# Patient Record
Sex: FEMALE | Race: WHITE | Employment: FULL TIME | URBAN - METROPOLITAN AREA
[De-identification: names, ages, dates, MRNs, and addresses within clinical notes are randomized per-mention and may not be internally consistent; named-entity substitution may affect disease eponyms.]

---

## 2019-10-08 PROCEDURE — 88304 TISSUE EXAM BY PATHOLOGIST: CPT | Performed by: SURGERY

## 2020-08-31 ENCOUNTER — OFFICE VISIT (OUTPATIENT)
Dept: OTOLARYNGOLOGY | Facility: CLINIC | Age: 45
End: 2020-08-31
Payer: COMMERCIAL

## 2020-08-31 VITALS
TEMPERATURE: 97 F | WEIGHT: 182 LBS | SYSTOLIC BLOOD PRESSURE: 120 MMHG | HEIGHT: 58.5 IN | BODY MASS INDEX: 37.18 KG/M2 | DIASTOLIC BLOOD PRESSURE: 90 MMHG

## 2020-08-31 DIAGNOSIS — K13.70 ORAL LESION: Primary | ICD-10-CM

## 2020-08-31 PROCEDURE — 3074F SYST BP LT 130 MM HG: CPT | Performed by: OTOLARYNGOLOGY

## 2020-08-31 PROCEDURE — 3080F DIAST BP >= 90 MM HG: CPT | Performed by: OTOLARYNGOLOGY

## 2020-08-31 PROCEDURE — 99203 OFFICE O/P NEW LOW 30 MIN: CPT | Performed by: OTOLARYNGOLOGY

## 2020-08-31 PROCEDURE — 3008F BODY MASS INDEX DOCD: CPT | Performed by: OTOLARYNGOLOGY

## 2020-09-01 NOTE — PROGRESS NOTES
Elizabeth Burch is a 39year old female.  Patient presents with:  Throat Problem: Patient Presents with: Patient referred by Dr. Alyssa Mackey for abnormal tissue in throat     HPI:   She was seen by her dentist recently and noted to have a lesion in the back Nasal Normal External nose - Normal. Nasal septum - Normal, Turbinates - Normal   Neurological Normal Memory - Normal. Cranial nerves - Cranial nerves II through XII grossly intact.    Neck Exam Normal Inspection - Normal. Palpation - Normal. Parotid glan

## 2023-05-15 ENCOUNTER — OFFICE VISIT (OUTPATIENT)
Dept: INTERNAL MEDICINE CLINIC | Facility: CLINIC | Age: 48
End: 2023-05-15

## 2023-05-15 VITALS
OXYGEN SATURATION: 96 % | WEIGHT: 200 LBS | HEIGHT: 58.5 IN | DIASTOLIC BLOOD PRESSURE: 98 MMHG | SYSTOLIC BLOOD PRESSURE: 136 MMHG | BODY MASS INDEX: 40.86 KG/M2 | HEART RATE: 103 BPM

## 2023-05-15 DIAGNOSIS — Z00.00 PHYSICAL EXAM, ANNUAL: Primary | ICD-10-CM

## 2023-05-15 DIAGNOSIS — Z80.0 FH: COLON CANCER IN FIRST DEGREE RELATIVE <60 YEARS OLD: ICD-10-CM

## 2023-05-15 DIAGNOSIS — Z12.11 COLON CANCER SCREENING: ICD-10-CM

## 2023-05-15 DIAGNOSIS — I10 HYPERTENSION, UNSPECIFIED TYPE: ICD-10-CM

## 2023-05-15 PROCEDURE — 3075F SYST BP GE 130 - 139MM HG: CPT | Performed by: INTERNAL MEDICINE

## 2023-05-15 PROCEDURE — 3080F DIAST BP >= 90 MM HG: CPT | Performed by: INTERNAL MEDICINE

## 2023-05-15 PROCEDURE — 3008F BODY MASS INDEX DOCD: CPT | Performed by: INTERNAL MEDICINE

## 2023-05-15 PROCEDURE — 99386 PREV VISIT NEW AGE 40-64: CPT | Performed by: INTERNAL MEDICINE

## 2023-06-05 ENCOUNTER — OFFICE VISIT (OUTPATIENT)
Dept: OBGYN CLINIC | Facility: CLINIC | Age: 48
End: 2023-06-05

## 2023-06-05 VITALS
BODY MASS INDEX: 41 KG/M2 | WEIGHT: 198.38 LBS | HEART RATE: 98 BPM | DIASTOLIC BLOOD PRESSURE: 87 MMHG | SYSTOLIC BLOOD PRESSURE: 132 MMHG

## 2023-06-05 DIAGNOSIS — Z01.419 ENCOUNTER FOR GYNECOLOGICAL EXAMINATION WITHOUT ABNORMAL FINDING: Primary | ICD-10-CM

## 2023-06-05 PROCEDURE — 3079F DIAST BP 80-89 MM HG: CPT | Performed by: OBSTETRICS & GYNECOLOGY

## 2023-06-05 PROCEDURE — 3075F SYST BP GE 130 - 139MM HG: CPT | Performed by: OBSTETRICS & GYNECOLOGY

## 2023-06-05 PROCEDURE — 99386 PREV VISIT NEW AGE 40-64: CPT | Performed by: OBSTETRICS & GYNECOLOGY

## 2023-06-05 NOTE — PROGRESS NOTES
Yamila Avila is a 50year old female [place] Patient's last menstrual period was 2023. Patient presents with:  Gyn Exam: ANNUAL EXAM -- new pt. Was at [facility] prior. Strong FHx breast cancer & colon cancer. Neg genetic testing for herself. OBSTETRICS HISTORY:     OB History    Para Term  AB Living   1       1     SAB IAB Ectopic Multiple Live Births   1              # Outcome Date GA Lbr Luis/2nd Weight Sex Delivery Anes PTL Lv   1 SAB                GYNE HISTORY:     Periods  irregular but normal       Sexual activity:   Not on file        Pap Date: 21  Pap Result Notes: PAP/HPV NEG  Follow Up Recommendation: MAMMO 3-30-23 HARPER BENIGN (PT STATES SHE DOES EVERY 6 MONTHS MAMMO)           View : No data to display.                   MEDICAL HISTORY:     Past Medical History:   Diagnosis Date    Allergic rhinitis     Dermatographia     Obesity      Past Surgical History:   Procedure Laterality Date    CHOLECYSTECTOMY  10/08/2019    lap radha/ASC-Dr Chacon    D & C      for SAB     OB History     T0    L0    SAB1  IAB0  Ectopic0  Multiple0  Live Births0      SOCIAL HISTORY:     Tobacco Use: Low Risk  (2023)      Patient History          Smoking Tobacco Use: Never          Smokeless Tobacco Use: Never          Passive Exposure: Never    FAMILY HISTORY:     Family History   Problem Relation Age of Onset    Heart Disease Father     Stroke Father     Uterine Cancer Mother     Colon Cancer Mother     Diabetes Mother     Hypertension Mother     Colon Cancer Maternal Grandmother     Breast Cancer Sister 48         MEDICATIONS:       Current Outpatient Medications:     Cetirizine HCl (ZYRTEC ALLERGY OR), Take by mouth., Disp: , Rfl:     ALLERGIES:     No Known Allergies      REVIEW OF SYSTEMS:     Constitutional:    denies fever / chills  Eyes:     denies blurred or double vision  Cardiovascular:  denies chest pain or palpitations  Respiratory:    denies shortness of breath  Gastrointestinal:  denies severe abdominal pain, frequent diarrhea or constipation, nausea / vomiting  Genitourinary:    denies dysuria, bothersome incontinence  Skin/Breast:   denies any breast pain, lumps, or discharge  Neurological:    denies frequent severe headaches  Psychiatric:   denies depression or anxiety, thoughts of harming self or others  Heme/Lymph:    denies easy bruising or bleeding      PHYSICAL EXAM:   Blood pressure 132/87, pulse 98, weight 198 lb 6.4 oz (90 kg), last menstrual period 05/12/2023. Constitutional:  well developed, well nourished  Head/Face:  normocephalic  Neck/Thyroid: thyroid symmetric, no thyromegaly, no nodules, no adenopathy  Lymphatic: no abnormal supraclavicular or axillary adenopathy is noted  Breast:   normal without palpable masses, tenderness, asymmetry, nipple discharge, nipple retraction or skin changes  Abdomen:   soft, nontender, nondistended, no masses  Skin/Hair:  no unusual rashes or bruises  Extremities:  no edema, no cyanosis  Psychiatric:   oriented to time, place, person and situation. Appropriate mood and affect    Pelvic Exam:  External Genitalia:  normal appearance, hair distribution, and no lesions  Urethral Meatus:   normal in size, location, without lesions and prolapse  Bladder:    no fullness, masses or tenderness  Vagina:    normal appearance without lesions, no abnormal discharge  Cervix:    normal without tenderness on motion  Uterus:    normal in size, contour, position, mobility, without tenderness  Adnexa:   normal without masses or tenderness  Perineum:   normal  Anus: no hemorroids       ASSESSMENT & PLAN:     Lorena Hoff was seen today for gyn exam.    Diagnoses and all orders for this visit:    Encounter for gynecological examination without abnormal finding    Encouraged SBE. SUMMARY:  Pap: Next cotest 6/24-26 per ASCCP guidelines.   BCM:  Withdrawal  STD screening: declines  Mammogram: done recently  HM updated  Depression screen: Depression Screening (PHQ-2/PHQ-9): Over the LAST 2 WEEKS   Little interest or pleasure in doing things (over the last two weeks)?: Not at all    Feeling down, depressed, or hopeless (over the last two weeks)?: Not at all    PHQ-2 SCORE: 0          FOLLOW-UP     Return in about 1 year (around 6/5/2024) for annual gyne exam, cotest.    Note to patient and family:  The [facility] makes medical notes available to patients in the interest of transparency. However, please be advised that this is a medical document. It is intended as a peer to peer communication. It is written in medical language and may contain abbreviations or verbiage that are technical and unfamiliar. It may appear blunt or direct. Medical documents are intended to carry relevant information, facts as evident, and the clinical opinion of the practitioner.

## 2023-08-22 ENCOUNTER — LAB ENCOUNTER (OUTPATIENT)
Dept: LAB | Age: 48
End: 2023-08-22
Attending: NURSE PRACTITIONER
Payer: COMMERCIAL

## 2023-08-22 ENCOUNTER — LAB ENCOUNTER (OUTPATIENT)
Dept: LAB | Facility: REFERENCE LAB | Age: 48
End: 2023-08-22
Attending: NURSE PRACTITIONER
Payer: COMMERCIAL

## 2023-08-22 ENCOUNTER — E-VISIT (OUTPATIENT)
Dept: TELEHEALTH | Age: 48
End: 2023-08-22
Payer: COMMERCIAL

## 2023-08-22 DIAGNOSIS — J02.9 SORE THROAT: Primary | ICD-10-CM

## 2023-08-22 DIAGNOSIS — Z11.52 ENCOUNTER FOR SCREENING FOR COVID-19: ICD-10-CM

## 2023-08-22 DIAGNOSIS — J02.9 SORE THROAT: ICD-10-CM

## 2023-08-22 LAB
BETA STREP GRP A SCREEN: NEGATIVE
SARS-COV-2 RNA RESP QL NAA+PROBE: NOT DETECTED

## 2023-08-22 PROCEDURE — 99422 OL DIG E/M SVC 11-20 MIN: CPT | Performed by: NURSE PRACTITIONER

## 2023-08-22 PROCEDURE — 87430 STREP A AG IA: CPT | Performed by: NURSE PRACTITIONER

## 2023-08-22 NOTE — PROGRESS NOTES
Karuna Carson is a 50year old female. HPI:   See answers to questions above. Current Outpatient Medications   Medication Sig Dispense Refill    Cetirizine HCl (ZYRTEC ALLERGY OR) Take by mouth. Past Medical History:   Diagnosis Date    Allergic rhinitis     Dermatographia     Obesity       Past Surgical History:   Procedure Laterality Date    CHOLECYSTECTOMY  10/08/2019    adrienne garcia/AURELIA-Dr Chacon    D & C  2007    for SAB      Family History   Problem Relation Age of Onset    Heart Disease Father     Stroke Father     Uterine Cancer Mother     Colon Cancer Mother     Diabetes Mother     Hypertension Mother     Colon Cancer Maternal Grandmother     Breast Cancer Sister 48      Social History:  Social History     Socioeconomic History    Marital status:    Tobacco Use    Smoking status: Never     Passive exposure: Never    Smokeless tobacco: Never   Vaping Use    Vaping Use: Never used   Substance and Sexual Activity    Alcohol use: Yes     Alcohol/week: 2.0 standard drinks of alcohol     Types: 2 Glasses of wine per week     Comment: Occasionally    Drug use: Never         ASSESSMENT AND PLAN:     Sore throat  (primary encounter diagnosis)  Encounter for screening for covid-19    Orders placed for rapid strep and rapid COVID through lab.   Provided with comfort measures  Advised to follow up in person for any new or worsening symptoms      Meds & Refills for this Visit:  Requested Prescriptions      No prescriptions requested or ordered in this encounter       Duration of  the service:  15 minutes    Patient advised to follow up with PCP if no improvement or worsening of symptoms  Refer to [employer] message for specific patient instructions

## 2024-04-11 ENCOUNTER — PATIENT MESSAGE (OUTPATIENT)
Dept: INTERNAL MEDICINE CLINIC | Facility: CLINIC | Age: 49
End: 2024-04-11

## 2024-04-11 DIAGNOSIS — Z12.31 BREAST CANCER SCREENING BY MAMMOGRAM: Primary | ICD-10-CM

## 2024-04-11 NOTE — TELEPHONE ENCOUNTER
Dr Cordero, please advise    Patient requests mammogram order.  No prior mammogram on file.  Patient was instructed to schedule an annual physical with you.  Order pended for approval/review.       Last OV 05/13/23    Future Appointments   Date Time Provider Department Center   5/21/2024 11:30 AM Maureen Montano MD [facility]         From: Karley Zuniga  To: Cara Cordero  Sent: 4/11/2024 10:16 AM CDT  Subject: Annual mammogram     I’d like to schedule the annual mammogram, but can’t find an order or the way to make that appointment on this chloe?  I received an email from you reminding me to schedule it. Thanks

## 2024-04-12 NOTE — TELEPHONE ENCOUNTER
DR Cordero =see below.     See [facility] , mammogram was done on 3/30/23 from [facility] .   LAST OFFICE VISIT 5/15/23.     Future Appointments   Date Time Provider Department Center   5/21/2024 11:30 AM Maureen Montano MD [facility]

## 2024-05-14 ENCOUNTER — HOSPITAL ENCOUNTER (OUTPATIENT)
Dept: MAMMOGRAPHY | Facility: HOSPITAL | Age: 49
Discharge: HOME | End: 2024-05-14
Attending: INTERNAL MEDICINE

## 2024-05-14 DIAGNOSIS — Z12.31 BREAST CANCER SCREENING BY MAMMOGRAM: ICD-10-CM

## 2024-05-14 PROCEDURE — 77063 BREAST TOMOSYNTHESIS BI: CPT | Performed by: INTERNAL MEDICINE

## 2024-05-14 PROCEDURE — 77067 SCR MAMMO BI INCL CAD: CPT | Performed by: INTERNAL MEDICINE

## 2024-05-21 ENCOUNTER — OFFICE VISIT (OUTPATIENT)
Dept: OBGYN CLINIC | Facility: CLINIC | Age: 49
End: 2024-05-21

## 2024-05-21 VITALS
BODY MASS INDEX: 40 KG/M2 | DIASTOLIC BLOOD PRESSURE: 83 MMHG | SYSTOLIC BLOOD PRESSURE: 137 MMHG | HEART RATE: 96 BPM | WEIGHT: 193.63 LBS

## 2024-05-21 DIAGNOSIS — N95.1 PERIMENOPAUSE: ICD-10-CM

## 2024-05-21 DIAGNOSIS — Z12.4 SCREENING FOR CERVICAL CANCER: ICD-10-CM

## 2024-05-21 DIAGNOSIS — Z01.419 ENCOUNTER FOR GYNECOLOGICAL EXAMINATION WITHOUT ABNORMAL FINDING: Primary | ICD-10-CM

## 2024-05-21 PROCEDURE — 99396 PREV VISIT EST AGE 40-64: CPT | Performed by: OBSTETRICS & GYNECOLOGY

## 2024-05-21 PROCEDURE — 3079F DIAST BP 80-89 MM HG: CPT | Performed by: OBSTETRICS & GYNECOLOGY

## 2024-05-21 PROCEDURE — 3075F SYST BP GE 130 - 139MM HG: CPT | Performed by: OBSTETRICS & GYNECOLOGY

## 2024-05-21 NOTE — PROGRESS NOTES
Karley Zuniga is a 49 year old female  Patient's last menstrual period was 01/15/2024 (approximate).   Chief Complaint   Patient presents with    Gyn Exam     Annual -- last period in January.   .    OBSTETRICS HISTORY:     OB History    Para Term  AB Living   1 0     1     SAB IAB Ectopic Multiple Live Births   1              # Outcome Date GA Lbr Luis/2nd Weight Sex Type Anes PTL Lv   1 SAB                GYNE HISTORY:     Periods  irregular       BCM:  Condoms    History   Sexual Activity    Sexual activity: Yes        Pap Date: 21  Pap Result Notes: Neg Pap/HPV // Mammo 23 Incomplete           No data to display                  MEDICAL HISTORY:     Past Medical History:    Allergic rhinitis    Dermatographia    Obesity     Past Surgical History:   Procedure Laterality Date    Cholecystectomy  10/08/2019    lap radha/[facility]-Dr Chacon    D & c      for SAB     OB History    Para Term  AB Living   1 0 0 0 1 0   SAB IAB Ectopic Multiple Live Births   1 0 0 0 0        SOCIAL HISTORY:     Tobacco Use: Low Risk  (2024)    Patient History     Smoking Tobacco Use: Never     Smokeless Tobacco Use: Never     Passive Exposure: Never       FAMILY HISTORY:     Family History   Problem Relation Age of Onset    Heart Disease Father     Stroke Father     Uterine Cancer Mother     Colon Cancer Mother     Diabetes Mother     Hypertension Mother     Colon Cancer Maternal Grandmother     Breast Cancer Sister 50         MEDICATIONS:       Current Outpatient Medications:     Cetirizine HCl (ZYRTEC ALLERGY OR), Take by mouth. (Patient not taking: Reported on 2024), Disp: , Rfl:     ALLERGIES:     No Known Allergies      REVIEW OF SYSTEMS:     Constitutional:    denies fever / chills  Eyes:     denies blurred or double vision  Cardiovascular:  denies chest pain or palpitations  Respiratory:    denies shortness of breath  Gastrointestinal:  denies severe abdominal pain,  frequent diarrhea or constipation, nausea / vomiting  Genitourinary:    denies dysuria, bothersome incontinence  Skin/Breast:   denies any breast pain, lumps, or discharge  Neurological:    denies frequent severe headaches  Psychiatric:   denies depression or anxiety, thoughts of harming self or others  Heme/Lymph:    denies easy bruising or bleeding      PHYSICAL EXAM:   Blood pressure 137/83, pulse 96, weight 193 lb 9.6 oz (87.8 kg), last menstrual period 01/15/2024.  Constitutional:  well developed, well nourished  Head/Face:  normocephalic  Neck/Thyroid: thyroid symmetric, no thyromegaly, no nodules, no adenopathy  Lymphatic: no abnormal supraclavicular or axillary adenopathy is noted  Breast:   normal without palpable masses, tenderness, asymmetry, nipple discharge, nipple retraction or skin changes  Abdomen:   soft, nontender, nondistended, no masses  Skin/Hair:  no unusual rashes or bruises  Extremities:  no edema, no cyanosis  Psychiatric:   oriented to time, place, person and situation. Appropriate mood and affect    Pelvic Exam:  External Genitalia:  normal appearance, hair distribution, and no lesions  Urethral Meatus:   normal in size, location, without lesions and prolapse  Bladder:    no fullness, masses or tenderness  Vagina:    normal appearance without lesions, no abnormal discharge  Cervix:    normal without tenderness on motion  Uterus:    normal in size, contour, position, mobility, without tenderness  Adnexa:   normal without masses or tenderness  Perineum:   normal  Anus: no hemorroids         ASSESSMENT & PLAN:     Karley was seen today for gyn exam.    Diagnoses and all orders for this visit:    Encounter for gynecological examination without abnormal finding    Perimenopause    Screening for cervical cancer  -     ThinPrep PAP Smear; Future  -     Hpv Dna  High Risk , Thin Prep Collect; Future  -     Hpv Dna  High Risk , Thin Prep Collect  -     ThinPrep PAP Smear        SUMMARY:  Pap: Next  cotest today per ASCCP guidelines.  BCM:  Condoms  STD screening: declines  Mammogram: done recently  HM updated  Depression screen:   Depression Screening (PHQ-2/PHQ-9): Over the LAST 2 WEEKS   Little interest or pleasure in doing things: Not at all    Feeling down, depressed, or hopeless: Not at all    PHQ-2 SCORE: 0          FOLLOW-UP     Return in about 1 year (around 5/21/2025) for annual gyne exam.    Note to patient and family:  The 21[employer] Century C[employer] Act makes medical notes available to patients in the interest of transparency.  However, please be advised that this is a medical document.  It is intended as a peer to peer communication.  It is written in medical language and may contain abbreviations or verbiage that are technical and unfamiliar.  It may appear blunt or direct.  Medical documents are intended to carry relevant information, facts as evident, and the clinical opinion of the practitioner.

## 2024-05-22 LAB — HPV I/H RISK 1 DNA SPEC QL NAA+PROBE: NEGATIVE

## 2024-05-24 ENCOUNTER — NURSE ONLY (OUTPATIENT)
Facility: CLINIC | Age: 49
End: 2024-05-24

## 2024-05-24 DIAGNOSIS — Z12.11 COLON CANCER SCREENING: Primary | ICD-10-CM

## 2024-05-24 DIAGNOSIS — Z80.0 FAMILY HISTORY OF COLON CANCER: ICD-10-CM

## 2024-05-24 NOTE — PROGRESS NOTES
Hannah,     Called patient for scheduled telephone colon screening.  Routed due to: BMI >40  Medications, pharmacy, and allergies verified with the patient.   **Please review and advise on colonoscopy and bowel prep orders**.   Thank you!

## 2024-05-24 NOTE — PROGRESS NOTES
Called patient for scheduled telephone colon screening/positive FIT result.   Medications, pharmacy, and allergies verified with the patient.   **Please advise on colonoscopy and bowel prep orders**.     Age 45-66 y/o (Y/N):   66-74 y/o ? Route to GI provider per rotation schedule   › GI MD preference:   › Insurance:  BCBS PPO  › Last PCP Visit: 5/15/2023  IF NO PCP within [facility] ? Not TCS/FIT Eligible   › Last CBC drawn: 9/26/2019  › Date of positive FIT test: N/A  › H/W/BMI: 4'10\"/193 lb/40.3    Special comments/notes:    Telephone colon screening Questionnaires:  Yes No   Are you currently experiencing any new GI symptoms? [] [x]   If yes, symptom details:     Rectal Bleeding with or without bowel movements: [] [x]   Black stool: [] [x]   Dysphagia &Food feeling/getting stuck: [] [x]   Intractable Vomiting: [] [x]   Unexplained weight loss: [] [x]   First colonoscopy? [x] []   Family history of colon cancer?Mother and maternal grandmother [x] []   Any issues with anesthesia?Passed out several hours after a dental procedure. [x] []   If yes, explain:      Personal history of Resp. Issues/Oxygen Use/MARIA LUZ/COPD? [] [x]   If yes, CPAP/BiPAP? [] []   History of devices Pacemaker/Defibrillator/Stents? [] [x]   History of Cardiac/CVA issues/(MI/Stroke):  [] [x]     Medication usage:  Yes  No   Anticoagulants:  Anticoagulant (Except Aspirin) ? Route to RN staff to obtain ordering provider orders [] [x]   Diabetic Meds:   PO DM Meds ? hold day prior and day of procedure  Insulin ? Route to RN clinical staff to obtain provider orders  [] [x]   Weight loss meds (Phentermine/Vyvanse/Saxsenda/etc): [] [x]   Iron/Herbal/Multivitamin Supplement (RX/OTC): [] [x]   Usage of marijuana, CBD &/or vape products: [] [x]           no

## 2024-05-28 RX ORDER — SODIUM, POTASSIUM,MAG SULFATES 17.5-3.13G
SOLUTION, RECONSTITUTED, ORAL ORAL
Qty: 1 EACH | Refills: 0 | Status: SHIPPED | OUTPATIENT
Start: 2024-05-28

## 2024-05-28 NOTE — PROGRESS NOTES
GI Schedulers-  Procedure: colonoscopy  Provider: Dr. Wall, Dr. Mcguire or Dr. Guerra   Dx: high risk CRC screening, family history of colon cancer  Sedation: MAC  Prep: Split dose suprep    Thank you.  APRIL Quick

## 2024-05-30 NOTE — PROGRESS NOTES
Scheduled for:  Colonoscopy [address]25  Provider Name:  Dr. Wall  Date:  Friday, 09/27/2024  Location:  [facility]  Sedation:  Mac  Time:  1;15 pm (pt is aware to arrive at 12;15 pm     Prep: Suprep   Meds/Allergies Reconciled?:  Yes    Diagnosis with codes:   high risk CRC screening Z12.11 family history of colon cancer Z80.0  Was patient informed to call insurance with codes (Y/N):  Yes     Referral sent?:  Referral was sent at the time of electronic surgical scheduling.    EM or [facility] notified?:  I sent an electronic request to Endo Scheduling and received a confirmation today.       Medication Orders:  None  Misc Orders:  None     Further instructions given by staff:  I discussed the prep instructions with the patient which she verbally understood and is aware that I will send the instructions today.via Magton

## 2024-06-19 ENCOUNTER — TELEPHONE (OUTPATIENT)
Dept: INTERNAL MEDICINE CLINIC | Facility: CLINIC | Age: 49
End: 2024-06-19

## 2024-06-19 DIAGNOSIS — Z00.00 PHYSICAL EXAM, ANNUAL: Primary | ICD-10-CM

## 2024-07-09 ENCOUNTER — LAB ENCOUNTER (OUTPATIENT)
Dept: LAB | Age: 49
End: 2024-07-09
Payer: COMMERCIAL

## 2024-07-09 DIAGNOSIS — Z00.00 PHYSICAL EXAM, ANNUAL: ICD-10-CM

## 2024-07-09 LAB
ALBUMIN SERPL-MCNC: 4.6 G/DL (ref 3.2–4.8)
ALBUMIN/GLOB SERPL: 1.8 {RATIO} (ref 1–2)
ALP LIVER SERPL-CCNC: 69 U/L
ALT SERPL-CCNC: 33 U/L
ANION GAP SERPL CALC-SCNC: 7 MMOL/L (ref 0–18)
AST SERPL-CCNC: 24 U/L (ref ?–34)
BASOPHILS # BLD AUTO: 0.03 X10(3) UL (ref 0–0.2)
BASOPHILS NFR BLD AUTO: 0.4 %
BILIRUB SERPL-MCNC: 0.6 MG/DL (ref 0.3–1.2)
BUN BLD-MCNC: 10 MG/DL (ref 9–23)
BUN/CREAT SERPL: 12.2 (ref 10–20)
CALCIUM BLD-MCNC: 10 MG/DL (ref 8.7–10.4)
CHLORIDE SERPL-SCNC: 106 MMOL/L (ref 98–112)
CHOLEST SERPL-MCNC: 213 MG/DL (ref ?–200)
CO2 SERPL-SCNC: 28 MMOL/L (ref 21–32)
CREAT BLD-MCNC: 0.82 MG/DL
DEPRECATED RDW RBC AUTO: 40.4 FL (ref 35.1–46.3)
EGFRCR SERPLBLD CKD-EPI 2021: 88 ML/MIN/1.73M2 (ref 60–?)
EOSINOPHIL # BLD AUTO: 0.19 X10(3) UL (ref 0–0.7)
EOSINOPHIL NFR BLD AUTO: 2.8 %
ERYTHROCYTE [DISTWIDTH] IN BLOOD BY AUTOMATED COUNT: 11.9 % (ref 11–15)
FASTING PATIENT LIPID ANSWER: YES
FASTING STATUS PATIENT QL REPORTED: YES
GLOBULIN PLAS-MCNC: 2.6 G/DL (ref 2–3.5)
GLUCOSE BLD-MCNC: 100 MG/DL (ref 70–99)
HCT VFR BLD AUTO: 44.2 %
HDLC SERPL-MCNC: 67 MG/DL (ref 40–59)
HGB BLD-MCNC: 15 G/DL
IMM GRANULOCYTES # BLD AUTO: 0.02 X10(3) UL (ref 0–1)
IMM GRANULOCYTES NFR BLD: 0.3 %
LDLC SERPL CALC-MCNC: 129 MG/DL (ref ?–100)
LYMPHOCYTES # BLD AUTO: 2.14 X10(3) UL (ref 1–4)
LYMPHOCYTES NFR BLD AUTO: 32 %
MCH RBC QN AUTO: 31.1 PG (ref 26–34)
MCHC RBC AUTO-ENTMCNC: 33.9 G/DL (ref 31–37)
MCV RBC AUTO: 91.5 FL
MONOCYTES # BLD AUTO: 0.47 X10(3) UL (ref 0.1–1)
MONOCYTES NFR BLD AUTO: 7 %
NEUTROPHILS # BLD AUTO: 3.84 X10 (3) UL (ref 1.5–7.7)
NEUTROPHILS # BLD AUTO: 3.84 X10(3) UL (ref 1.5–7.7)
NEUTROPHILS NFR BLD AUTO: 57.5 %
NONHDLC SERPL-MCNC: 146 MG/DL (ref ?–130)
OSMOLALITY SERPL CALC.SUM OF ELEC: 291 MOSM/KG (ref 275–295)
PLATELET # BLD AUTO: 231 10(3)UL (ref 150–450)
POTASSIUM SERPL-SCNC: 4.7 MMOL/L (ref 3.5–5.1)
PROT SERPL-MCNC: 7.2 G/DL (ref 5.7–8.2)
RBC # BLD AUTO: 4.83 X10(6)UL
SODIUM SERPL-SCNC: 141 MMOL/L (ref 136–145)
TRIGL SERPL-MCNC: 96 MG/DL (ref 30–149)
TSI SER-ACNC: 0.99 MIU/ML (ref 0.55–4.78)
VLDLC SERPL CALC-MCNC: 17 MG/DL (ref 0–30)
WBC # BLD AUTO: 6.7 X10(3) UL (ref 4–11)

## 2024-07-09 PROCEDURE — 85025 COMPLETE CBC W/AUTO DIFF WBC: CPT

## 2024-07-09 PROCEDURE — 80053 COMPREHEN METABOLIC PANEL: CPT

## 2024-07-09 PROCEDURE — 80061 LIPID PANEL: CPT

## 2024-07-09 PROCEDURE — 36415 COLL VENOUS BLD VENIPUNCTURE: CPT

## 2024-07-09 PROCEDURE — 84443 ASSAY THYROID STIM HORMONE: CPT

## 2024-07-12 ENCOUNTER — PATIENT MESSAGE (OUTPATIENT)
Dept: INTERNAL MEDICINE CLINIC | Facility: CLINIC | Age: 49
End: 2024-07-12

## 2024-07-12 ENCOUNTER — OFFICE VISIT (OUTPATIENT)
Dept: INTERNAL MEDICINE CLINIC | Facility: CLINIC | Age: 49
End: 2024-07-12
Payer: COMMERCIAL

## 2024-07-12 VITALS
WEIGHT: 194.38 LBS | DIASTOLIC BLOOD PRESSURE: 80 MMHG | OXYGEN SATURATION: 98 % | HEART RATE: 98 BPM | BODY MASS INDEX: 39.71 KG/M2 | HEIGHT: 58.5 IN | SYSTOLIC BLOOD PRESSURE: 108 MMHG

## 2024-07-12 DIAGNOSIS — Z23 NEED FOR VACCINATION: ICD-10-CM

## 2024-07-12 DIAGNOSIS — S46.912A STRAIN OF LEFT SHOULDER, INITIAL ENCOUNTER: ICD-10-CM

## 2024-07-12 DIAGNOSIS — E55.9 VITAMIN D DEFICIENCY: ICD-10-CM

## 2024-07-12 DIAGNOSIS — Z00.00 PHYSICAL EXAM, ANNUAL: Primary | ICD-10-CM

## 2024-07-12 DIAGNOSIS — R73.01 ELEVATED FASTING BLOOD SUGAR: ICD-10-CM

## 2024-07-12 PROCEDURE — 90715 TDAP VACCINE 7 YRS/> IM: CPT | Performed by: INTERNAL MEDICINE

## 2024-07-12 PROCEDURE — 3079F DIAST BP 80-89 MM HG: CPT | Performed by: INTERNAL MEDICINE

## 2024-07-12 PROCEDURE — 90471 IMMUNIZATION ADMIN: CPT | Performed by: INTERNAL MEDICINE

## 2024-07-12 PROCEDURE — 99396 PREV VISIT EST AGE 40-64: CPT | Performed by: INTERNAL MEDICINE

## 2024-07-12 PROCEDURE — 3074F SYST BP LT 130 MM HG: CPT | Performed by: INTERNAL MEDICINE

## 2024-07-12 PROCEDURE — 3008F BODY MASS INDEX DOCD: CPT | Performed by: INTERNAL MEDICINE

## 2024-07-12 NOTE — PROGRESS NOTES
Karley Zuniga is a 49 year old female.  Chief Complaint   Patient presents with    Physical     Would like tdap today        HPI:   PT COMES FOR HER ANNUAL PHYSICAL   C/C ANNUAL physical  C/O overall doing well and planning on retiring end of this yr and has a list of things she will do to keep busy       HISTORY  5/2023   New patient  C/C establish care-used to see Dr. Poole  C/o annual physical    irregular periods , no period x 60 days and then heavy bleeding - 4 days but with  clots            PMH  dermatography -derm      Lives with  and 2 dogs , works a controller /ACCOUNTING           Current Outpatient Medications   Medication Sig Dispense Refill    Na Sulfate-K Sulfate-Mg Sulf (SUPREP BOWEL PREP KIT) 17.5-3.13-1.6 GM/177ML Oral Solution Take as directed by GI clinic. Okay to substitute for generic. 1 each 0    Cetirizine HCl (ZYRTEC ALLERGY OR) Take by mouth. (Patient not taking: Reported on 5/21/2024)        Past Medical History:    Allergic rhinitis    Dermatographia    Obesity      Past Surgical History:   Procedure Laterality Date    Cholecystectomy  10/08/2019    adrienne garcia/AURELIA-Dr Chacon    D & c  2007    for SAB      Social History:  Social History     Socioeconomic History    Marital status:    Tobacco Use    Smoking status: Never     Passive exposure: Never    Smokeless tobacco: Never   Vaping Use    Vaping status: Never Used   Substance and Sexual Activity    Alcohol use: Yes     Alcohol/week: 2.0 standard drinks of alcohol     Types: 2 Glasses of wine per week     Comment: Occasionally    Drug use: Never    Sexual activity: Yes        REVIEW OF SYSTEMS:   GENERAL HEALTH: No fevers, chills, sweats, fatigue  VISION: No recent vision problems, blurry vision or double vision  HEENT: No decreased hearing ear pain nasal congestion or sore throat  SKIN: denies any unusual skin lesions or rashes  RESPIRATORY: denies shortness of breath, cough, wheezing  CARDIOVASCULAR: denies chest pain  on exertion, palpitations, swelling in feet  GI: denies abdominal pain and + heartburn- ,no occasionally may be due to meals nausea or vomiting  : No Pain on urination, change in the color of urine, discharge, urinating frequently  MUS: No back pain, joint pain, muscle pain  NEURO: denies headaches , anxiety, depression    EXAM:   /80   Pulse 111   Ht 4' 10.5\" (1.486 m)   Wt 194 lb 6.4 oz (88.2 kg)   LMP 01/15/2024 (Approximate)   SpO2 98%   BMI 39.94 kg/m²   GENERAL: well developed, well nourished,in no apparent distress  SKIN: no rashes,no suspicious lesions  HEENT: atraumatic, normocephalic,ears and throat are clear, no frontal or maxillary Sinus tenderness, pupils equal reactive to light bilaterally, extraocular muscles intact  NECK: supple,no adenopathy,nontender   LUNGS: clear to auscultation, no wheeze  CARDIO: RRR without murmur  GI: good BS's,no masses or tenderness  EXTREMITIES: no cyanosis, or edema    ASSESSMENT AND PLAN:   Diagnoses and all orders for this visit:    Physical exam, annual  Patient to watch as she is exercise, seatbelt use no texting driving, sunscreen use advised    Need for vaccination  -     TdaP (Boostrix/Adacel) Vaccine (> 7 Y)  Tdap today  Strain of left shoulder, initial encounter    Left muscle strain - Can try topical agent such as [place] Aspercreme IcyHot and then stick a Salonpas on during the day when she is at work, alternate holding heavy bads      Preventative medicine  Mammogram 5/2024  Pap smear 6/2021 and 5/2024 dr choi   Colonoscopy- scheduled   Labs 7/2024     The patient indicates understanding of these issues and agrees to the plan.  No follow-ups on file.

## 2024-07-14 NOTE — TELEPHONE ENCOUNTER
From: Karley Zuniga  To: Cara Cordero  Sent: 7/12/2024 10:49 AM CDT  Subject: Hi again- Just wanted to ask about your view of probiotic and prebiotics? I’ve been reading so much about the connection of our health to our gut and haven’t incorporated those yet. Thanks!    Do you recommend probiotics and/or prebiotics?

## 2024-09-20 ENCOUNTER — PATIENT MESSAGE (OUTPATIENT)
Dept: INTERNAL MEDICINE CLINIC | Facility: CLINIC | Age: 49
End: 2024-09-20

## 2024-09-22 NOTE — TELEPHONE ENCOUNTER
From: Karley Zuniga  To: Cara Cordero  Sent: 9/20/2024 3:41 PM CDT  Subject: Prep for colonoscopy     Hi there-  I’m scheduled for my colonoscopy on Friday the 27th. The split prep dose instructions indicates the prescription should be for “GoLytely, NuLytely, Trilyte or CoLyte”. I don’t see that in the prescriptions available for me. Can someone please check on the right prep prescription? Thanks!

## 2024-09-23 ENCOUNTER — TELEPHONE (OUTPATIENT)
Facility: CLINIC | Age: 49
End: 2024-09-23

## 2024-09-23 RX ORDER — PROGESTERONE 100 MG/1
100 CAPSULE ORAL NIGHTLY
COMMUNITY
Start: 2024-08-28

## 2024-09-23 RX ORDER — ESTRADIOL 0.04 MG/D
1 PATCH, EXTENDED RELEASE TRANSDERMAL
COMMUNITY
Start: 2024-08-28

## 2024-09-23 NOTE — TELEPHONE ENCOUNTER
Spoke to patient    Her instructions had Golytely, Nulytely, Colytely checked but Suprep was ordered. Patient wanted to confirm it is okay to take the suprep instead.    I explained that the suprep was ordered and she can follow the same directions in her mychart.    Patient verbalized understanding and had no further questions

## 2024-09-27 ENCOUNTER — ANESTHESIA (OUTPATIENT)
Dept: ENDOSCOPY | Age: 49
End: 2024-09-27
Payer: COMMERCIAL

## 2024-09-27 ENCOUNTER — HOSPITAL ENCOUNTER (OUTPATIENT)
Age: 49
Setting detail: HOSPITAL OUTPATIENT SURGERY
Discharge: HOME | End: 2024-09-27
Attending: INTERNAL MEDICINE | Admitting: INTERNAL MEDICINE
Payer: COMMERCIAL

## 2024-09-27 ENCOUNTER — ANESTHESIA EVENT (OUTPATIENT)
Dept: ENDOSCOPY | Age: 49
End: 2024-09-27
Payer: COMMERCIAL

## 2024-09-27 VITALS
HEIGHT: 58.5 IN | OXYGEN SATURATION: 99 % | SYSTOLIC BLOOD PRESSURE: 115 MMHG | RESPIRATION RATE: 20 BRPM | BODY MASS INDEX: 38.82 KG/M2 | WEIGHT: 190 LBS | DIASTOLIC BLOOD PRESSURE: 87 MMHG | TEMPERATURE: 98 F | HEART RATE: 64 BPM

## 2024-09-27 DIAGNOSIS — Z12.11 COLON CANCER SCREENING: ICD-10-CM

## 2024-09-27 DIAGNOSIS — Z80.0 FAMILY HISTORY OF COLON CANCER: ICD-10-CM

## 2024-09-27 LAB — B-HCG UR QL: NEGATIVE

## 2024-09-27 PROCEDURE — 81025 URINE PREGNANCY TEST: CPT

## 2024-09-27 PROCEDURE — 88305 TISSUE EXAM BY PATHOLOGIST: CPT | Performed by: INTERNAL MEDICINE

## 2024-09-27 PROCEDURE — 45385 COLONOSCOPY W/LESION REMOVAL: CPT | Performed by: INTERNAL MEDICINE

## 2024-09-27 PROCEDURE — 99070 SPECIAL SUPPLIES PHYS/QHP: CPT | Performed by: INTERNAL MEDICINE

## 2024-09-27 RX ORDER — SODIUM CHLORIDE, SODIUM LACTATE, POTASSIUM CHLORIDE, CALCIUM CHLORIDE 600; 310; 30; 20 MG/100ML; MG/100ML; MG/100ML; MG/100ML
INJECTION, SOLUTION INTRAVENOUS CONTINUOUS
Status: DISCONTINUED | OUTPATIENT
Start: 2024-09-27 | End: 2024-09-27

## 2024-09-27 RX ORDER — LIDOCAINE HYDROCHLORIDE 10 MG/ML
INJECTION, SOLUTION EPIDURAL; INFILTRATION; INTRACAUDAL; PERINEURAL AS NEEDED
Status: DISCONTINUED | OUTPATIENT
Start: 2024-09-27 | End: 2024-09-27 | Stop reason: SURG

## 2024-09-27 RX ORDER — NALOXONE HYDROCHLORIDE 0.4 MG/ML
0.08 INJECTION, SOLUTION INTRAMUSCULAR; INTRAVENOUS; SUBCUTANEOUS ONCE AS NEEDED
Status: DISCONTINUED | OUTPATIENT
Start: 2024-09-27 | End: 2024-09-27

## 2024-09-27 RX ADMIN — LIDOCAINE HYDROCHLORIDE 50 MG: 10 INJECTION, SOLUTION EPIDURAL; INFILTRATION; INTRACAUDAL; PERINEURAL at 13:27:00

## 2024-09-27 RX ADMIN — SODIUM CHLORIDE, SODIUM LACTATE, POTASSIUM CHLORIDE, CALCIUM CHLORIDE: 600; 310; 30; 20 INJECTION, SOLUTION INTRAVENOUS at 13:27:00

## 2024-09-27 NOTE — DISCHARGE INSTRUCTIONS
Home Care Instructions for Colonoscopy with Sedation    Diet:  - Resume your regular diet as tolerated unless otherwise instructed.  - Start with light meals to minimize bloating.  - Do not drink alcohol today.    Medication:  - If you have questions about resuming your normal medications, please contact your Primary Care Physician.    Activities:  - Take it easy today. Do not return to work today.  - Do not drive today.  - Do not operate any machinery today (including kitchen equipment).    Colonoscopy:  - You may notice some rectal \"spotting\" (a little blood on the toilet tissue) for a day or two after the exam. This is normal.  - If you experience any rectal bleeding (not spotting), persistent tenderness or sharp severe abdominal pains, oral temperature over 100 degrees Fahrenheit, light-headedness or dizziness, or any other problems, contact your doctor.      **If unable to reach your doctor, please go to the [facility] Emergency Room**    - Your referring physician will receive a full report of your examination.  - If you do not hear from your doctor's office within two weeks of your biopsy, please call them for your results.    You may be able to see your laboratory results in [employer] between 4 and 7 business days.  In some cases, your physician may not have viewed the results before they are released to [employer].  If you have questions regarding your results contact the physician who ordered the test/exam by phone or via [employer] by choosing \"Ask a Medical Question.\"

## 2024-09-27 NOTE — OPERATIVE REPORT
COLONOSCOPY REPORT    Karley Zuniga     1975 Age 49 year old   PCP Cara Cordero MD Endoscopist Amanda Wall MD       Date of procedure: 24    Procedure: Colonoscopy w/ cold snare polypectomy     Pre-operative diagnosis: CRC screening     Post-operative diagnosis: colon polyps, diverticulosis     Medications: MAC    Withdrawal time: 25 minutes    Procedure:  Informed consent was obtained from the patient after the risks of the procedure were discussed, including but not limited to bleeding, perforation, aspiration, infection, or possibility of a missed lesion. After discussions of the risks/benefits and alternatives to this procedure, as well as the planned sedation, the patient was placed in the left lateral decubitus position and begun on continuous blood pressure pulse oximetry and EKG monitoring and this was maintained throughout the procedure. Once an adequate level of sedation was obtained a digital rectal exam was completed. Then the lubricated tip of the [id number] diagnostic video colonoscope was inserted and advanced without difficulty to the cecum using water immersion and CO2 insufflation technique. The cecum was identified by localizing the trifold, the appendix and the ileocecal valve. Withdrawal was begun with thorough washing and careful examination of the colonic walls and folds. A routine second examination of the cecum/ascending colon was performed. Photodocumentation was obtained. The bowel prep was excellent. Views of the colon were excellent with washing. I then carefully withdrew the instrument from the patient who tolerated the procedure well.     Complications: none.    Findings:   1. Five polyps noted as follows:      A. THREE -- 4-8 mm polyps in the ascending colon; sessile morphology; cold snare polypectomy performed, polyps retrieved.      B. TWO -- 3-5 mm polyps in the transverse colon; sessile morphology; cold snare polypectomy performed, polyps  retrieved.    2. Diverticulosis: few in the sigmoid colon.    3. Ileocecal valve appeared healthy and normal. The examined portion of the terminal ileum appeared normal.     4. The colonic mucosa throughout the colon showed normal vascular pattern, without evidence of angioectasias or inflammation.     5. A retroflexed view of the rectum appeared normal.    6. PER: normal rectal tone, no masses palpated.     Impression:   Five sub-centimeter polyps removed.   Few sigmoid diverticula.   The colon was otherwise normal with glistening mucosa and intact vascular pattern.    Recommend:  Await pathology. The interval for the next colonoscopy will be determined after reviewing pathology. If new signs or symptoms develop, colonoscopy may need to be repeated sooner.   High fiber diet.  Monitor for blood in the stool. If having more than just tinge of blood, call office or go to the ER.  Avoid NSAIDs (motrin, ibuprofen, aleve, advil, naproxen, midol, naprosyn, excedrin) for 14 days.     >>>If tissue was obtained and you have not received your pathology results either by phone or letter within 2 weeks, please call our office at [phone].    Specimens: colon polyps     Blood loss: <1 ml

## 2024-09-27 NOTE — H&P
History & Physical Examination    Patient Name: Karley Zuniga  MRN: [id number]  CSN: [id number]  YOB: 1975    Diagnosis: screening for colon cancer    Medications Prior to Admission   Medication Sig Dispense Refill Last Dose    Estradiol 0.0375 MG/24HR Transdermal Patch Biweekly Place 1 patch onto the skin twice a week.       progesterone 100 MG Oral Cap Take 1 capsule (100 mg total) by mouth nightly. TAKE AT BEDTIME       Na Sulfate-K Sulfate-Mg Sulf (SUPREP BOWEL PREP KIT) 17.5-3.13-1.6 GM/177ML Oral Solution Take as directed by GI clinic. Okay to substitute for generic. 1 each 0     Cetirizine HCl (ZYRTEC ALLERGY OR) Take by mouth. (Patient not taking: Reported on 5/21/2024)        No current facility-administered medications for this encounter.       Allergies:   Allergies   Allergen Reactions    Nickel RASH       Past Medical History:    Allergic rhinitis    Dermatographia    Obesity     Past Surgical History:   Procedure Laterality Date    Cholecystectomy  10/08/2019    adrienne garcia/[facility]-Dr Chacon    D & c  2007    for SAB     Family History   Problem Relation Age of Onset    Heart Disease Father     Stroke Father     Uterine Cancer Mother     Colon Cancer Mother     Diabetes Mother     Hypertension Mother     Colon Cancer Maternal Grandmother     Breast Cancer Sister 50     Social History     Tobacco Use    Smoking status: Never     Passive exposure: Never    Smokeless tobacco: Never   Substance Use Topics    Alcohol use: Yes     Alcohol/week: 2.0 standard drinks of alcohol     Types: 2 Glasses of wine per week     Comment: Occasionally       SYSTEM Check if Review is Normal Check if Physical Exam is Normal If not normal, please explain:   HEENT [X ] [ X]    NECK  [X ] [ X]    HEART [X ] [ X]    LUNGS [X ] [ X]    ABDOMEN [X ] [ X]    EXTREMITIES [X ] [ X]    OTHER        I have discussed the risks and benefits and alternatives of the procedure with the patient/family.  They understand and agree  to proceed with plan of care.   I have reviewed the History and Physical done within the last 30 days.  Any changes noted above.    Amanda Wall MD  [facility]

## 2024-09-27 NOTE — ANESTHESIA PREPROCEDURE EVALUATION
Anesthesia PreOp Note    HPI:     Karley Zuniga is a 49 year old female who presents for preoperative consultation requested by: Amanda Wall MD    Date of Surgery: 9/27/2024    Procedure(s):  COLONOSCOPY  Indication: Colon cancer screening/ Family history of colon cancer    Relevant Problems   No relevant active problems       NPO:  Last Liquid Consumption Date: 09/27/24  Last Liquid Consumption Time: 0930  Last Solid Consumption Date: 09/25/24  Last Solid Consumption Time: 2300  Last Liquid Consumption Date: 09/27/24          History Review:  Patient Active Problem List    Diagnosis Date Noted    FH: colon cancer in first degree relative <60 years old 05/15/2023       Past Medical History:    Allergic rhinitis    Dermatographia    Obesity       Past Surgical History:   Procedure Laterality Date    Cholecystectomy  10/08/2019    adrienne garcia/[facility]-Dr Chacon    D & c  2007    for SAB       Medications Prior to Admission   Medication Sig Dispense Refill Last Dose    Estradiol 0.0375 MG/24HR Transdermal Patch Biweekly Place 1 patch onto the skin twice a week.    at on    progesterone 100 MG Oral Cap Take 1 capsule (100 mg total) by mouth nightly. TAKE AT BEDTIME   9/26/2024    Na Sulfate-K Sulfate-Mg Sulf (SUPREP BOWEL PREP KIT) 17.5-3.13-1.6 GM/177ML Oral Solution Take as directed by GI clinic. Okay to substitute for generic. 1 each 0     Cetirizine HCl (ZYRTEC ALLERGY OR) Take by mouth. (Patient not taking: Reported on 5/21/2024)        Current Facility-Administered Medications Ordered in Epic   Medication Dose Route Frequency Provider Last Rate Last Admin    lactated ringers infusion   Intravenous Continuous Amanda Wall MD         No current [facility]-ordered outpatient medications on file.       Allergies   Allergen Reactions    Nickel RASH       Family History   Problem Relation Age of Onset    Heart Disease Father     Stroke Father     Uterine Cancer Mother     Colon Cancer Mother      Diabetes Mother     Hypertension Mother     Colon Cancer Maternal Grandmother     Breast Cancer Sister 50     Social History     Socioeconomic History    Marital status:    Tobacco Use    Smoking status: Never     Passive exposure: Never    Smokeless tobacco: Never   Vaping Use    Vaping status: Never Used   Substance and Sexual Activity    Alcohol use: Yes     Alcohol/week: 2.0 standard drinks of alcohol     Types: 2 Glasses of wine per week     Comment: Occasionally    Drug use: Never    Sexual activity: Yes       Available pre-op labs reviewed.  Lab Results   Component Value Date    WBC 6.7 07/09/2024    RBC 4.83 07/09/2024    HGB 15.0 07/09/2024    HCT 44.2 07/09/2024    MCV 91.5 07/09/2024    MCH 31.1 07/09/2024    MCHC 33.9 07/09/2024    RDW 11.9 07/09/2024    .0 07/09/2024    URINEPREG Negative 09/27/2024     Lab Results   Component Value Date     07/09/2024    K 4.7 07/09/2024     07/09/2024    CO2 28.0 07/09/2024    BUN 10 07/09/2024    CREATSERUM 0.82 07/09/2024     (H) 07/09/2024    CA 10.0 07/09/2024          Vital Signs:  Body mass index is 39.03 kg/m².   height is 1.486 m (4' 10.5\") and weight is 86.2 kg (190 lb). Her blood pressure is 131/78 and her pulse is 89. Her respiration is 20 and oxygen saturation is 97%.   Vitals:    09/23/24 1353 09/27/24 1219   BP:  131/78   Pulse:  89   Resp:  20   SpO2:  97%   Weight: 86.2 kg (190 lb) 86.2 kg (190 lb)   Height: 1.486 m (4' 10.5\") 1.486 m (4' 10.5\")        Anesthesia Evaluation     Patient summary reviewed and Nursing notes reviewed    Airway   Mallampati: I  TM distance: >3 FB  Neck ROM: full  Dental      Pulmonary - negative ROS and normal exam   Cardiovascular - negative ROS and normal exam    Neuro/Psych - negative ROS     GI/Hepatic/Renal - negative ROS     Endo/Other - negative ROS   Abdominal   (+) obese                 Anesthesia Plan:   ASA:  2  Plan:   MAC  Informed Consent Plan and Risks Discussed With:   Patient  Discussed plan with:  Surgeon      I have informed Karley Zuniga and/or legal guardian or family member of the nature of the anesthetic plan, benefits, risks including possible dental damage if relevant, major complications, and any alternative forms of anesthetic management.   All of the patient's questions were answered to the best of my ability. The patient desires the anesthetic management as planned.  PARIS SHAY MD  9/27/2024 1:19 PM  Present on Admission:  **None**

## 2024-09-27 NOTE — ANESTHESIA POSTPROCEDURE EVALUATION
Patient: Karley Zuniga    Procedure Summary       Date: 09/27/24 Room / Location: [facility] ENDOSCOPY 02 / [facility] ENDO    Anesthesia Start: 1325 Anesthesia Stop:     Procedure: COLONOSCOPY Diagnosis:       Colon cancer screening      Family history of colon cancer      (Polyps, Diverticulosis)    Surgeons: Amanda Wall MD Anesthesiologist: Ernestina Mendoza MD    Anesthesia Type: MAC ASA Status: 2            Anesthesia Type: MAC    Vitals Value Taken Time   /68 09/27/24 1411   Temp 97.5 °F (36.4 °C) 09/27/24 1411   Pulse 78 09/27/24 1411   Resp 16 09/27/24 1411   SpO2 96 % 09/27/24 1411       [facility] AN Post Evaluation:   Patient Evaluated in PACU  Patient Participation: complete - patient participated  Level of Consciousness: awake  Pain Management: adequate  Airway Patency:patent  Dental exam unchanged from preop  Yes    Cardiovascular Status: acceptable  Respiratory Status: acceptable  Postoperative Hydration acceptable      ERNESTINA MENDOZA MD  9/27/2024 2:11 PM

## 2024-09-30 ENCOUNTER — TELEPHONE (OUTPATIENT)
Facility: CLINIC | Age: 49
End: 2024-09-30

## 2024-09-30 NOTE — TELEPHONE ENCOUNTER
Health Maintenance Updated.    3 year colonoscopy recall entered into patient outreach in [facility].  Next colonoscopy will be due 9/27/2027.

## 2024-09-30 NOTE — TELEPHONE ENCOUNTER
----- Message from Amanda Wall sent at 9/30/2024  9:31 AM CDT -----  GI Staff:    Can you please place recall for this patient to have a colonoscopy in 3 years.    Thank you,  Amanda

## 2024-09-30 NOTE — PROGRESS NOTES
GI Staff:    Can you please place recall for this patient to have a colonoscopy in 3 years.    Thank you,  Amanda

## 2025-04-17 ENCOUNTER — PATIENT MESSAGE (OUTPATIENT)
Dept: OBGYN CLINIC | Facility: CLINIC | Age: 50
End: 2025-04-17

## 2025-04-17 DIAGNOSIS — Z12.31 ENCOUNTER FOR SCREENING MAMMOGRAM FOR MALIGNANT NEOPLASM OF BREAST: Primary | ICD-10-CM

## 2025-05-23 ENCOUNTER — OFFICE VISIT (OUTPATIENT)
Dept: OBGYN CLINIC | Facility: CLINIC | Age: 50
End: 2025-05-23
Payer: COMMERCIAL

## 2025-05-23 VITALS
BODY MASS INDEX: 40.29 KG/M2 | WEIGHT: 197.19 LBS | DIASTOLIC BLOOD PRESSURE: 82 MMHG | SYSTOLIC BLOOD PRESSURE: 118 MMHG | HEIGHT: 58.5 IN | HEART RATE: 101 BPM

## 2025-05-23 DIAGNOSIS — Z01.419 ENCOUNTER FOR GYNECOLOGICAL EXAMINATION WITHOUT ABNORMAL FINDING: Primary | ICD-10-CM

## 2025-05-23 DIAGNOSIS — N95.1 PERIMENOPAUSE: ICD-10-CM

## 2025-05-23 PROCEDURE — 99396 PREV VISIT EST AGE 40-64: CPT | Performed by: OBSTETRICS & GYNECOLOGY

## 2025-05-23 PROCEDURE — 3074F SYST BP LT 130 MM HG: CPT | Performed by: OBSTETRICS & GYNECOLOGY

## 2025-05-23 PROCEDURE — 3008F BODY MASS INDEX DOCD: CPT | Performed by: OBSTETRICS & GYNECOLOGY

## 2025-05-23 PROCEDURE — 3079F DIAST BP 80-89 MM HG: CPT | Performed by: OBSTETRICS & GYNECOLOGY

## 2025-05-23 NOTE — PROGRESS NOTES
The following individual(s) verbally consented to be recorded using ambient AI listening technology and understand that they can each withdraw their consent to this listening technology at any point by asking the clinician to turn off or pause the recording:    Patient name: Karley Zuniga   Vascular Attending:  Dr Ngo

## 2025-05-27 NOTE — PROGRESS NOTES
Karley Zuniga is a 50 year old female  Patient's last menstrual period was 2025 (exact date).   Chief Complaint   Patient presents with    Gyn Exam     ANNUAL EXAM   .  History of Present Illness  Karley Zuniga is a 50 year old female who presents for her annual gynecologic exam.     Her last Pap smear in May 2024 was normal with negative HPV, and her last mammogram was also normal. She has an upcoming mammogram scheduled for 2025.    Upset her perimenopausal Sx not taken seriously last visit. [place] she was told to wait until 12 months of no period to be treated. Therefore she called on line [facility] clinic for treatment. She started hormone replacement therapy (HRT) in 2024 through an online service, taking estrogen and progesterone. No exam done. Just did call to nurse. She uses an estrogen patch, changing it every Tuesday and Friday, and takes 100 mg of progesterone at bedtime. She has experienced significant improvement in shoulder pain since starting HRT. However, she continues to experience significant vaginal dryness, weight gain, and occasional brain fog. She also reports a lack of interest in sex, partly due to vaginal dryness and her 's erectile dysfunction issues.    She has experienced eczema, particularly in areas of friction, and sometimes on her labia, which she finds uncomfortable. She is seeing a dermatologist for this issue.    Her menstrual history includes irregular periods since , with the last period occurring on 2025. Her periods have been haphazard, occurring approximately every three months, with the longest interval being five months.    Family history is significant for her mother's diagnosis of extramammary Paget's disease, initially suspected to be vulvar cancer, but later clarified. Her mother has also had colon and endometrial cancer, but testing for Zazueta syndrome was negative.    She had her first colonoscopy in 2024,  during which three or four polyps were removed. Her next colonoscopy is due in 2027.    She retired in 2024 and is focusing on her health journey.    No new surgeries or changes in family history besides her mother's condition. No issues with cholesterol and no peanut allergies.    OBSTETRICS HISTORY:     OB History    Para Term  AB Living   1 0 0 0 1 0   SAB IAB Ectopic Multiple Live Births   1 0 0 0 0      # Outcome Date GA Lbr Luis/2nd Weight Sex Type Anes PTL Lv   1 SAB                GYNE HISTORY:     Periods irregular      BCM:  None    History   Sexual Activity    Sexual activity: Yes    Birth control/ protection: Contraceptive patch        Pap Date: 24  Pap Result Notes: NEG HPV NEG          Latest Ref Rng & Units 2024     1:03 PM   RECENT PAP RESULTS   INTERPRETATION/RESULT: Negative for intraepithelial lesion or malignancy Negative for intraepithelial lesion or malignancy    HPV Negative Negative          MEDICAL HISTORY:     Past Medical History:    Allergic rhinitis    Colon polyps    next in 3 yrs    Dermatographia    Obesity     Past Surgical History:   Procedure Laterality Date    Cholecystectomy  10/08/2019    lap radha/[facility]-Dr Chacon    Colonoscopy N/A 2024    Procedure: COLONOSCOPY;  Surgeon: Amanda Wall MD;  Location: [facility] ENDO    D & c      for SAB     OB History    Para Term  AB Living   1 0 0 0 1 0   SAB IAB Ectopic Multiple Live Births   1 0 0 0 0        SOCIAL HISTORY:     Tobacco Use: Low Risk  (2025)    Patient History     Smoking Tobacco Use: Never     Smokeless Tobacco Use: Never     Passive Exposure: Never       FAMILY HISTORY:     Family History   Problem Relation Age of Onset    Heart Disease Father     Stroke Father     Uterine Cancer Mother     Colon Cancer Mother     Diabetes Mother     Hypertension Mother     Colon Cancer Maternal Grandmother     Breast Cancer Sister 50         MEDICATIONS:        Current Outpatient Medications:     Estradiol 0.0375 MG/24HR Transdermal Patch Biweekly, Place 1 patch onto the skin twice a week., Disp: , Rfl:     progesterone 100 MG Oral Cap, Take 1 capsule (100 mg total) by mouth nightly. TAKE AT BEDTIME, Disp: , Rfl:     Cetirizine HCl (ZYRTEC ALLERGY OR), Take by mouth. (Patient not taking: Reported on 5/21/2024), Disp: , Rfl:     ALLERGIES:         Allergies   Allergen Reactions    Nickel RASH         REVIEW OF SYSTEMS:     Constitutional:    denies fever / chills  Eyes:     denies blurred or double vision  Cardiovascular:  denies chest pain or palpitations  Respiratory:    denies shortness of breath  Gastrointestinal:  denies severe abdominal pain, frequent diarrhea or constipation, nausea / vomiting  Genitourinary:    denies dysuria, bothersome incontinence  Skin/Breast:   denies any breast pain, lumps, or discharge  Neurological:    denies frequent severe headaches  Psychiatric:   denies depression or anxiety, thoughts of harming self or others  Heme/Lymph:    denies easy bruising or bleeding      PHYSICAL EXAM:   Blood pressure 118/82, pulse 101, height 4' 10.5\" (1.486 m), weight 197 lb 3.2 oz (89.4 kg), last menstrual period 04/12/2025.  Constitutional:  well developed, well nourished  Head/Face:  normocephalic  Neck/Thyroid: thyroid symmetric, no thyromegaly, no nodules, no adenopathy  Lymphatic: no abnormal supraclavicular or axillary adenopathy is noted  Breast:   normal without palpable masses, tenderness, asymmetry, nipple discharge, nipple retraction or skin changes  Abdomen:   soft, nontender, nondistended, no masses  Skin/Hair:  no unusual rashes or bruises  Extremities:  no edema, no cyanosis  Psychiatric:   oriented to time, place, person and situation. Appropriate mood and affect    Pelvic Exam:  External Genitalia:  normal appearance, hair distribution, and no lesions  Urethral Meatus:   normal in size, location, without lesions and prolapse  Bladder:     no fullness, masses or tenderness  Vagina:    normal appearance without lesions, no abnormal discharge  Cervix:    normal without tenderness on motion  Uterus:    normal in size, contour, position, mobility, without tenderness  Adnexa:   normal without masses or tenderness  Perineum:   normal  Anus: no hemorroids     Results  RADIOLOGY  Mammogram: Normal (05/2024)    DIAGNOSTIC  Colonoscopy: Four polyps removed (09/2024)    PATHOLOGY  Pap smear: Normal with negative HPV (05/2024)  Lymph node biopsy: Negative for malignancy (01/2025)    ASSESSMENT & PLAN:     Karley was seen today for gyn exam.    Diagnoses and all orders for this visit:    Encounter for gynecological examination without abnormal finding    Perimenopause      Assessment & Plan  Perimenopause  Perimenopausal symptoms, including vaginal dryness, weight gain, brain fog, and decreased libido, have persisted since summer 2020. Initiation of hormone replacement therapy (HRT) with estrogen and progesterone in October 2024 improved shoulder pain but not vaginal dryness. Current estrogen dose may be suboptimal, as it is lower than the standard starting dose. She uses an estrogen patch changed twice weekly and oral progesterone at bedtime.  - Increase estrogen patch dose from 0.0375 mg to 0.05 mg.  - Continue progesterone 100 mg at bedtime.  - Monitor symptoms and report any abnormal bleeding or side effects.  - Ensure annual gynecological exams and up-to-date mammograms.  - Discuss the importance of regular follow-up and monitoring due to potential HRT risks.    Vaginal dryness  Persistent vaginal dryness not fully resolved with current HRT regimen, contributing to decreased libido and discomfort. Dryness is constant, not limited to intercourse.  - Increase estrogen patch dose to 0.05 mg to assess for improvement in vaginal dryness.    Abnormal uterine bleeding  Intermittent abnormal uterine bleeding with periods every few months, last on April 12, 2025.  Bleeding pattern aligns with perimenopausal transition and current HRT regimen. She is advised to report bleeding more frequent than monthly, heavier than normal, or of longer duration.  - Monitor bleeding pattern and report any abnormal bleeding patterns.  -Offerred cycle prometrium or using cylic HRT to provide predictable vaginal bleeding patterns during transition -- wishes to retain daily usage.    Eczema  Eczema present in areas of friction, including labia, managed under dermatological care. It may contribute to vulvar discomfort.  - Continue dermatological care for eczema management.    SUMMARY:  Pap: Next cotest 5/27-29 per ASCCP guidelines.  BCM:  None  STD screening: declines  Mammogram: scheduled  HM updated  Depression screen:   Depression Screening (PHQ-2/PHQ-9): Over the LAST 2 WEEKS   Little interest or pleasure in doing things: Not at all    Feeling down, depressed, or hopeless: Not at all    PHQ-2 SCORE: 0          FOLLOW-UP     Return in about 1 year (around 5/23/2026) for annual gyne exam.    Note to patient and family:  The 21st Century Cures Act makes medical notes available to patients in the interest of transparency.  However, please be advised that this is a medical document.  It is intended as a peer to peer communication.  It is written in medical language and may contain abbreviations or verbiage that are technical and unfamiliar.  It may appear blunt or direct.  Medical documents are intended to carry relevant information, facts as evident, and the clinical opinion of the practitioner.

## 2025-06-09 ENCOUNTER — HOSPITAL ENCOUNTER (OUTPATIENT)
Dept: MAMMOGRAPHY | Facility: HOSPITAL | Age: 50
Discharge: HOME | End: 2025-06-09
Attending: OBSTETRICS & GYNECOLOGY
Payer: COMMERCIAL

## 2025-06-09 DIAGNOSIS — Z12.31 ENCOUNTER FOR SCREENING MAMMOGRAM FOR MALIGNANT NEOPLASM OF BREAST: ICD-10-CM

## 2025-06-09 PROCEDURE — 77067 SCR MAMMO BI INCL CAD: CPT | Performed by: OBSTETRICS & GYNECOLOGY

## 2025-06-09 PROCEDURE — 77063 BREAST TOMOSYNTHESIS BI: CPT | Performed by: OBSTETRICS & GYNECOLOGY

## 2025-06-10 DIAGNOSIS — N95.1 PERIMENOPAUSE: Primary | ICD-10-CM

## 2025-06-11 RX ORDER — ESTRADIOL 0.04 MG/D
1 PATCH, EXTENDED RELEASE TRANSDERMAL
Refills: 0 | Status: CANCELLED | OUTPATIENT
Start: 2025-06-11

## 2025-06-13 RX ORDER — ESTRADIOL 0.05 MG/D
1 PATCH, EXTENDED RELEASE TRANSDERMAL
Qty: 24 EACH | Refills: 3 | Status: SHIPPED | OUTPATIENT
Start: 2025-06-13

## 2025-06-13 RX ORDER — PROGESTERONE 100 MG/1
100 CAPSULE ORAL NIGHTLY
Qty: 90 CAPSULE | Refills: 3 | Status: SHIPPED | OUTPATIENT
Start: 2025-06-13

## 2025-06-13 NOTE — TELEPHONE ENCOUNTER
Per office notes on 5/23/2025 at annual with Dr. Montano-  - Increase estrogen patch dose from 0.0375 mg to 0.05 mg.  - Continue progesterone 100 mg at bedtime.    Medication sent and patient notified.

## 2025-07-11 ENCOUNTER — LAB ENCOUNTER (OUTPATIENT)
Dept: LAB | Age: 50
End: 2025-07-11
Attending: INTERNAL MEDICINE
Payer: COMMERCIAL

## 2025-07-11 DIAGNOSIS — E55.9 VITAMIN D DEFICIENCY: ICD-10-CM

## 2025-07-11 DIAGNOSIS — Z00.00 PHYSICAL EXAM, ANNUAL: ICD-10-CM

## 2025-07-11 DIAGNOSIS — R73.01 ELEVATED FASTING BLOOD SUGAR: ICD-10-CM

## 2025-07-11 LAB
ALBUMIN SERPL-MCNC: 4.5 G/DL (ref 3.2–4.8)
ALBUMIN/GLOB SERPL: 1.9 {RATIO} (ref 1–2)
ALP LIVER SERPL-CCNC: 66 U/L (ref 39–100)
ALT SERPL-CCNC: 24 U/L (ref 10–49)
ANION GAP SERPL CALC-SCNC: 7 MMOL/L (ref 0–18)
AST SERPL-CCNC: 20 U/L (ref ?–34)
BILIRUB SERPL-MCNC: 0.6 MG/DL (ref 0.3–1.2)
BUN BLD-MCNC: 12 MG/DL (ref 9–23)
BUN/CREAT SERPL: 15.4 (ref 10–20)
CALCIUM BLD-MCNC: 9 MG/DL (ref 8.7–10.4)
CHLORIDE SERPL-SCNC: 104 MMOL/L (ref 98–112)
CHOLEST SERPL-MCNC: 177 MG/DL (ref ?–200)
CO2 SERPL-SCNC: 26 MMOL/L (ref 21–32)
CREAT BLD-MCNC: 0.78 MG/DL (ref 0.55–1.02)
DEPRECATED RDW RBC AUTO: 41.4 FL (ref 35.1–46.3)
EGFRCR SERPLBLD CKD-EPI 2021: 92 ML/MIN/1.73M2 (ref 60–?)
ERYTHROCYTE [DISTWIDTH] IN BLOOD BY AUTOMATED COUNT: 11.9 % (ref 11–15)
EST. AVERAGE GLUCOSE BLD GHB EST-MCNC: 100 MG/DL (ref 68–126)
FASTING PATIENT LIPID ANSWER: YES
FASTING STATUS PATIENT QL REPORTED: YES
GLOBULIN PLAS-MCNC: 2.4 G/DL (ref 2–3.5)
GLUCOSE BLD-MCNC: 90 MG/DL (ref 70–99)
HBA1C MFR BLD: 5.1 % (ref ?–5.7)
HCT VFR BLD AUTO: 42.4 % (ref 35–48)
HDLC SERPL-MCNC: 65 MG/DL (ref 40–59)
HGB BLD-MCNC: 14.3 G/DL (ref 12–16)
LDLC SERPL CALC-MCNC: 98 MG/DL (ref ?–100)
MCH RBC QN AUTO: 32.1 PG (ref 26–34)
MCHC RBC AUTO-ENTMCNC: 33.7 G/DL (ref 31–37)
MCV RBC AUTO: 95.1 FL (ref 80–100)
NONHDLC SERPL-MCNC: 112 MG/DL (ref ?–130)
OSMOLALITY SERPL CALC.SUM OF ELEC: 283 MOSM/KG (ref 275–295)
PLATELET # BLD AUTO: 224 10(3)UL (ref 150–450)
POTASSIUM SERPL-SCNC: 4.6 MMOL/L (ref 3.5–5.1)
PROT SERPL-MCNC: 6.9 G/DL (ref 5.7–8.2)
RBC # BLD AUTO: 4.46 X10(6)UL (ref 3.8–5.3)
SODIUM SERPL-SCNC: 137 MMOL/L (ref 136–145)
TRIGL SERPL-MCNC: 78 MG/DL (ref 30–149)
TSI SER-ACNC: 1.49 UIU/ML (ref 0.55–4.78)
VIT D+METAB SERPL-MCNC: 37.1 NG/ML (ref 30–100)
VLDLC SERPL CALC-MCNC: 13 MG/DL (ref 0–30)
WBC # BLD AUTO: 6.8 X10(3) UL (ref 4–11)

## 2025-07-11 PROCEDURE — 82306 VITAMIN D 25 HYDROXY: CPT

## 2025-07-11 PROCEDURE — 85027 COMPLETE CBC AUTOMATED: CPT

## 2025-07-11 PROCEDURE — 83036 HEMOGLOBIN GLYCOSYLATED A1C: CPT

## 2025-07-11 PROCEDURE — 84443 ASSAY THYROID STIM HORMONE: CPT

## 2025-07-11 PROCEDURE — 80061 LIPID PANEL: CPT

## 2025-07-11 PROCEDURE — 80053 COMPREHEN METABOLIC PANEL: CPT

## 2025-07-11 PROCEDURE — 36415 COLL VENOUS BLD VENIPUNCTURE: CPT

## 2025-07-14 DIAGNOSIS — N95.1 PERIMENOPAUSE: ICD-10-CM

## 2025-07-14 RX ORDER — PROGESTERONE 100 MG/1
100 CAPSULE ORAL NIGHTLY
Qty: 90 CAPSULE | Refills: 3 | Status: CANCELLED | OUTPATIENT
Start: 2025-07-14

## 2025-07-15 ENCOUNTER — OFFICE VISIT (OUTPATIENT)
Dept: INTERNAL MEDICINE CLINIC | Facility: CLINIC | Age: 50
End: 2025-07-15

## 2025-07-15 VITALS
WEIGHT: 202 LBS | HEART RATE: 96 BPM | HEIGHT: 58.5 IN | SYSTOLIC BLOOD PRESSURE: 124 MMHG | OXYGEN SATURATION: 99 % | BODY MASS INDEX: 41.27 KG/M2 | DIASTOLIC BLOOD PRESSURE: 84 MMHG

## 2025-07-15 DIAGNOSIS — Z00.00 PHYSICAL EXAM, ANNUAL: Primary | ICD-10-CM

## 2025-07-15 DIAGNOSIS — E55.9 VITAMIN D DEFICIENCY: ICD-10-CM

## 2025-07-15 PROCEDURE — 3074F SYST BP LT 130 MM HG: CPT | Performed by: INTERNAL MEDICINE

## 2025-07-15 PROCEDURE — 3008F BODY MASS INDEX DOCD: CPT | Performed by: INTERNAL MEDICINE

## 2025-07-15 PROCEDURE — 3079F DIAST BP 80-89 MM HG: CPT | Performed by: INTERNAL MEDICINE

## 2025-07-15 PROCEDURE — 99396 PREV VISIT EST AGE 40-64: CPT | Performed by: INTERNAL MEDICINE

## 2025-07-15 NOTE — PROGRESS NOTES
Karley Zungia is a 50 year old female.  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 Poole  C/o annual physical    irregular periods , no period x 60 days and then heavy bleeding - 4 days but with  clots            PMH  dermatography -derm      Lives with  and 2 dogs , works a controller /ACCOUNTING        Current Medications[1]   Past Medical History[2]   Past Surgical History[3]   Social History:  Short Social Hx on File[4]     REVIEW OF SYSTEMS:   GENERAL HEALTH: No fevers, chills, sweats, fatigue  VISION: No recent vision problems, blurry vision or double vision  HEENT: No decreased hearing ear pain nasal congestion or sore throat  SKIN: denies any unusual skin lesions or rashes  RESPIRATORY: denies shortness of breath, cough, wheezing  CARDIOVASCULAR: denies chest pain on exertion, palpitations, swelling in feet  GI: denies abdominal pain and denies heartburn, nausea or vomiting  : No Pain on urination, change in the color of urine, discharge, urinating frequently  MUS: No back pain, joint pain, muscle pain  NEURO: denies headaches , anxiety, depression    EXAM:   /84   Pulse 96   Ht 4' 10.5\" (1.486 m)   Wt 202 lb (91.6 kg)   LMP 04/12/2025 (Exact Date)   SpO2 99%   BMI 41.50 kg/m²   GENERAL: well developed, well nourished,in no apparent distress  SKIN: no rashes,no suspicious  lesions  HEENT: atraumatic, normocephalic,ears and throat are clear, No frontal or maxillary sinus tenderness, pupils equal reactive to light bilaterally, extraocular muscles intact  NECK: supple,no adenopathy,nontender   LUNGS: clear to auscultation, no wheeze  CARDIO: RRR without murmur  GI: good BS's,no masses or tenderness  EXTREMITIES: no cyanosis, or edema    ASSESSMENT AND PLAN:   Diagnoses and all orders for this visit:    Physical exam, annual      Patient to watch what she eats and  exercise, seatbelt use no texting and driving, sunscreen use advised           Preventative medicine  Mammogram 6/2025  Pap smear 6/2021 and 5/2024 dr cohi   Colonoscopy- Cscope dr magallanes 9/24 rpt in 3yrs   Labs 7/2025      The patient indicates understanding of these issues and agrees to the plan.  No follow-ups on file.       [1]   Current Outpatient Medications   Medication Sig Dispense Refill    estradiol 0.05 MG/24HR Transdermal Patch Biweekly Place 1 patch onto the skin twice a week. 24 each 3    progesterone 100 MG Oral Cap Take 1 capsule (100 mg total) by mouth nightly. TAKE AT BEDTIME 90 capsule 3    Cetirizine HCl (ZYRTEC ALLERGY OR) Take by mouth.     [2]   Past Medical History:   Allergic rhinitis    Colon polyps    next in 3 yrs    Dermatographia    Dyspareunia    Obesity   [3]   Past Surgical History:  Procedure Laterality Date    Cholecystectomy  10/08/2019    lap radha/[facility]-Dr Chacon    Colonoscopy N/A 9/27/2024    Procedure: COLONOSCOPY;  Surgeon: Amanda Wall MD;  Location: [facility], [facility]    D & c  [facility]    for SAB   [4]   Social History  Socioeconomic History    Marital status:    Tobacco Use    Smoking status: Never     Passive exposure: Never    Smokeless tobacco: Never   Vaping Use    Vaping status: Never Used   Substance and Sexual Activity    Alcohol use: Yes     Alcohol/week: 2.0 standard drinks of alcohol     Types: 2 Glasses of wine per week     Comment: Occasionally    Drug use:  Never    Sexual activity: Yes     Birth control/protection: Contraceptive patch     Social Drivers of Health     Food Insecurity: No Food Insecurity (7/15/2025)    NCSS - Food Insecurity     Worried About Running Out of Food in the Last Year: No     Ran Out of Food in the Last Year: No   Transportation Needs: No Transportation Needs (7/15/2025)    NCSS - Transportation     Lack of Transportation: No   Housing Stability: Not At Risk (7/15/2025)    NCSS - Housing/Utilities     Has Housing: Yes     Worried About Losing Housing: No     Unable to Get Utilities: No

## 2025-07-16 ENCOUNTER — PATIENT MESSAGE (OUTPATIENT)
Dept: OBGYN CLINIC | Facility: CLINIC | Age: 50
End: 2025-07-16

## 2025-07-16 NOTE — TELEPHONE ENCOUNTER
Pt was given refill of progesterone 100mg on 6/13/25 for 90 capsules with 3 refills.     Called pts [facility] pharmacy and spoke with Val. Val stated that they dont have RX on file for progesterone. Verbal given to Val for progesterone 100mg for 90 capsules with 3 refills.

## 2025-08-14 ENCOUNTER — PATIENT MESSAGE (OUTPATIENT)
Dept: INTERNAL MEDICINE CLINIC | Facility: CLINIC | Age: 50
End: 2025-08-14

## (undated) DEVICE — SYRINGE, LUER SLIP, STERILE, 60ML: Brand: MEDLINE

## (undated) DEVICE — CO2 CANNULA,SSOFT,ADLT,7O2,4CO2,FEMALE: Brand: MEDLINE

## (undated) DEVICE — KIT ENDO ORCAPOD 160/180/190

## (undated) DEVICE — LASSO POLYPECTOMY SNARE: Brand: LASSO

## (undated) DEVICE — KIT CLEAN ENDOKIT 1.1OZ GOWNX2

## (undated) DEVICE — V2 SPECIMEN COLLECTION MANIFOLD KIT: Brand: NEPTUNE

## (undated) NOTE — LETTER
[facility]  Administration of Anesthesia  I, Karley Zuniga agree to be cared for by a physician anesthesiologist alone and/or with a nurse anesthetist, who is specially trained to monitor me and give me medicine to put me to sleep or keep me comfortable during my procedure    I understand that my anesthesiologist and/or anesthetist is not an employee or agent of [facility] or [employer]. He or she works for [facility]    As the patient asking for anesthesia services, I agree to:  Allow the anesthesiologist (anesthesia doctor) to give me medicine and do additional procedures as necessary. Some examples are: Starting or using an “IV” to give me medicine, fluids or blood during my procedure, and having a breathing tube placed to help me breathe when I’m asleep (intubation). In the event that my heart stops working properly, I understand that my anesthesiologist will make every effort to sustain my life, unless otherwise directed by [facility] Do Not Resuscitate documents.  Tell my anesthesia doctor before my procedure:  If I am pregnant.  The last time that I ate or drank.  iii. All of the medicines I take (including prescriptions, herbal supplements, and pills I can buy without a prescription (including street drugs/illegal medications). Failure to inform my anesthesiologist about these medicines may increase my risk of anesthetic complications.  iv.If I am allergic to anything or have had a reaction to anesthesia before.  I understand how the anesthesia medicine will help me (benefits).  I understand that with any type of anesthesia medicine there are risks:  The most common risks are: nausea, vomiting, sore throat, muscle soreness, damage to my eyes, mouth, or teeth (from breathing tube placement).  Rare risks include: remembering what happened during my procedure, allergic reactions to medications, injury to my airway, heart, lungs, vision, nerves, or  muscles and in extremely rare instances death.  My doctor has explained to me other choices available to me for my care (alternatives).  Pregnant Patients (“epidural”):  I understand that the risks of having an epidural (medicine given into my back to help control pain during labor), include itching, low blood pressure, difficulty urinating, headache or slowing of the baby’s heart. Very rare risks include infection, bleeding, seizure, irregular heart rhythms and nerve injury.  Regional Anesthesia (“spinal”, “epidural”, & “nerve blocks”):  I understand that rare but potential complications include headache, bleeding, infection, seizure, irregular heart rhythms, and nerve injury.    _____________________________________________________________________________  Patient (or Representative) Signature/Relationship to Patient  Date   Time    _____________________________________________________________________________   Name (if used)    Language/Organization   Time    _____________________________________________________________________________  Nurse Anesthetist Signature     Date   Time  _____________________________________________________________________________  Anesthesiologist Signature     Date   Time  I have discussed the procedure and information above with the patient (or patient’s representative) and answered their questions. The patient or their representative has agreed to have anesthesia services.    _____________________________________________________________________________  Witness        Date   Time  I have verified that the signature is that of the patient or patient’s representative, and that it was signed before the procedure  Patient Name: Karley Zuniga     : 1975                 Printed: 2024 at 6:59 AM    Medical Record #: [id number]                                            Page 1 of 1  ----------ANESTHESIA CONSENT----------

## (undated) NOTE — MR AVS SNAPSHOT
After Visit Summary   5/21/2024    Karley Zuniga   MRN: [id number]           Visit Information     Date & Time  5/21/2024 11:30 AM Provider  Maureen Montano MD Department  [facility] - OB/GYN Dept. Phone  [phone]      Your Vitals Were  Most recent update: 5/21/2024 11:50 AM    BP   137/83    Pulse   96    Wt   193 lb 9.6 oz    LMP   01/15/2024 (Approximate)    BMI   39.77 kg/m²         Allergies as of 5/21/2024  Review status set to Review Complete on 5/21/2024   No Known Allergies     Your Current Medications        Dosage    Cetirizine HCl (ZYRTEC ALLERGY OR) Take by mouth.      Diagnoses for This Visit    Encounter for gynecological examination without abnormal finding   [942250]  -  Primary  Perimenopause   [045871]    Screening for cervical cancer   [417267]             Follow-up    Return in about 1 year (around 5/21/2025) for annual gyne exam.     We Ordered the Following     Normal Orders This Visit    Hpv Dna  High Risk , Thin Prep Collect [QAH9070 CUSTOM]     THINPREP PAP SMEAR ONLY [XSK9931 CUSTOM]     ThinPrep PAP Smear [[id number] CUSTOM]     Future Labs/Procedures Expected by Expires    Hpv Dna  High Risk , Thin Prep Collect [[id number] CUSTOM]  5/21/2024 5/21/2025    ThinPrep PAP Smear [[id number] CUSTOM]  5/21/2024 5/21/2025      Future Appointments        Provider Department    5/24/2024 2:30 PM GI COLON SCREENING [facility]    7/23/2024 9:40 AM Cara Cordero [facility]    5/23/2025 8:20 AM Maureen Montano [facility] - OB/GYN      Follow-up Instructions    Return in about 1 year (around 5/21/2025) for annual gyne exam.                  Did you know that [facility] primary care physicians now offer Video Visits through [facility] for adult patients for a variety of conditions such as allergies, back pain and cold symptoms? Skip the  drive and waiting room and online chat with a doctor face-to-face using your web-cam enabled computer or mobile device wherever you are. Video Visits cost $50 and can be paid hassle-free using a credit, debit, or health savings card.  Not active on [employer]? Ask us how to get signed up today!          If you receive a survey from [facility], please take a few minutes to complete it and provide feedback. We strive to deliver the best patient experience and are looking for ways to make improvements. Your feedback will help us do so. For more information on [facility], please visit [email or web address]           No text in SmartText           No text in SmartText

## (undated) NOTE — LETTER
[facility]  [address], [place], [place]    Authorization for Surgical Operation and Procedure                               I hereby authorize Amanda Wall MD, my physician and his/her assistants (if applicable), which may include medical students, residents, and/or fellows, to perform the following surgical operation/ procedure and administer such anesthesia as may be determined necessary by my physician: Operation/Procedure name (s) COLONOSCOPY on Karley Zuniga   2.   I recognize that during the surgical operation/procedure, unforeseen conditions may necessitate additional or different procedures than those listed above.  I, therefore, further authorize and request that the above-named surgeon, assistants, or designees perform such procedures as are, in their judgment, necessary and desirable.    3.   My surgeon/physician has discussed prior to my surgery the potential benefits, risks and side effects of this procedure; the likelihood of achieving goals; and potential problems that might occur during recuperation.  They also discussed reasonable alternatives to the procedure, including risks, benefits, and side effects related to the alternatives and risks related to not receiving this procedure.  I have had all my questions answered and I acknowledge that no guarantee has been made as to the result that may be obtained.    4.   Should the need arise during my operation/procedure, which includes change of level of care prior to discharge, I also consent to the administration of blood and/or blood products.  Further, I understand that despite careful testing and screening of blood or blood products by collecting agencies, I may still be subject to ill effects as a result of receiving a blood transfusion and/or blood products.  The following are some, but not all, of the potential risks that can occur: fever and allergic reactions, hemolytic reactions, transmission of  diseases such as Hepatitis, AIDS and Cytomegalovirus (CMV) and fluid overload.  In the event that I wish to have an autologous transfusion of my own blood, or a directed donor transfusion, I will discuss this with my physician.  Check only if Refusing Blood or Blood Products  I understand refusal of blood or blood products as deemed necessary by my physician may have serious consequences to my condition to include possible death. I hereby assume responsibility for my refusal and release the hospital, its personnel, and my physicians from any responsibility for the consequences of my refusal.    o  Refuse   5.   I authorize the use of any specimen, organs, tissues, body parts or foreign objects that may be removed from my body during the operation/procedure for diagnosis, research or teaching purposes and their subsequent disposal by hospital authorities.  I also authorize the release of specimen test results and/or written reports to my treating physician on the hospital medical staff or other referring or consulting physicians involved in my care, at the discretion of the Pathologist or my treating physician.    6.   I consent to the photographing or videotaping of the operations or procedures to be performed, including appropriate portions of my body for medical, scientific, or educational purposes, provided my identity is not revealed by the pictures or by descriptive texts accompanying them.  If the procedure has been photographed/videotaped, the surgeon will obtain the original picture, image, videotape or CD.  The hospital will not be responsible for storage, release or maintenance of the picture, image, tape or CD.    7.   I consent to the presence of a  or observers in the operating room as deemed necessary by my physician or their designees.    8.   I recognize that in the event my procedure results in extended X-Ray/fluoroscopy time, I may develop a skin reaction.    9. If I have a Do Not  Attempt Resuscitation (DNAR) order in place, that status will be suspended while in the operating room, procedural suite, and during the recovery period unless otherwise explicitly stated by me (or a person authorized to consent on my behalf). The surgeon or my attending physician will determine when the applicable recovery period ends for purposes of reinstating the DNAR order.  10. Patients having a sterilization procedure: I understand that if the procedure is successful the results will be permanent and it will therefore be impossible for me to inseminate, conceive, or bear children.  I also understand that the procedure is intended to result in sterility, although the result has not been guaranteed.   11. I acknowledge that my physician has explained sedation/analgesia administration to me including the risk and benefits I consent to the administration of sedation/analgesia as may be necessary or desirable in the judgment of my physician.    I CERTIFY THAT I HAVE READ AND FULLY UNDERSTAND THE ABOVE CONSENT TO OPERATION and/or OTHER PROCEDURE.     ____________________________________  _________________________________        ______________________________  Signature of Patient    Signature of Responsible Person                Printed Name of Responsible Person                                      ____________________________________  _____________________________                ________________________________  Signature of Witness        Date  Time         Relationship to Patient    STATEMENT OF PHYSICIAN My signature below affirms that prior to the time of the procedure; I have explained to the patient and/or his/her legal representative, the risks and benefits involved in the proposed treatment and any reasonable alternative to the proposed treatment. I have also explained the risks and benefits involved in refusal of the proposed treatment and alternatives to the proposed treatment and have answered the  patient's questions. If I have a significant financial interest in a co-management agreement or a significant financial interest in any product or implant, or other significant relationship used in this procedure/surgery, I have disclosed this and had a discussion with my patient.     _____________________________________________________              _____________________________  (Signature of Physician)                                                                                         (Date)                                   (Time)  Patient Name: Karley ROCHA Efrain      : 1975      Printed: 2024     Medical Record #: [id number]                                      Page 1 of 1